# Patient Record
Sex: MALE | URBAN - METROPOLITAN AREA
[De-identification: names, ages, dates, MRNs, and addresses within clinical notes are randomized per-mention and may not be internally consistent; named-entity substitution may affect disease eponyms.]

---

## 2024-09-06 ENCOUNTER — APPOINTMENT (OUTPATIENT)
Dept: PRIMARY CARE | Facility: CLINIC | Age: 49
End: 2024-09-06
Payer: COMMERCIAL

## 2024-09-06 VITALS
OXYGEN SATURATION: 94 % | HEART RATE: 91 BPM | TEMPERATURE: 98.7 F | DIASTOLIC BLOOD PRESSURE: 82 MMHG | WEIGHT: 227 LBS | BODY MASS INDEX: 31.66 KG/M2 | SYSTOLIC BLOOD PRESSURE: 132 MMHG

## 2024-09-06 DIAGNOSIS — F43.23 ADJUSTMENT REACTION WITH ANXIETY AND DEPRESSION: ICD-10-CM

## 2024-09-06 DIAGNOSIS — F41.9 ANXIETY: Primary | ICD-10-CM

## 2024-09-06 DIAGNOSIS — Z13.220 LIPID SCREENING: ICD-10-CM

## 2024-09-06 PROCEDURE — 1036F TOBACCO NON-USER: CPT | Performed by: STUDENT IN AN ORGANIZED HEALTH CARE EDUCATION/TRAINING PROGRAM

## 2024-09-06 PROCEDURE — 99204 OFFICE O/P NEW MOD 45 MIN: CPT | Performed by: STUDENT IN AN ORGANIZED HEALTH CARE EDUCATION/TRAINING PROGRAM

## 2024-09-06 RX ORDER — SERTRALINE HYDROCHLORIDE 25 MG/1
25 TABLET, FILM COATED ORAL DAILY
Qty: 30 TABLET | Refills: 1 | Status: SHIPPED | OUTPATIENT
Start: 2024-09-06 | End: 2024-11-05

## 2024-09-06 RX ORDER — HYDROXYZINE PAMOATE 25 MG/1
CAPSULE ORAL
COMMUNITY
Start: 2024-08-29

## 2024-09-06 RX ORDER — CLOTRIMAZOLE 1 %
CREAM (GRAM) TOPICAL
COMMUNITY
Start: 2024-08-29

## 2024-09-06 ASSESSMENT — ENCOUNTER SYMPTOMS
SINUS PAIN: 0
ARTHRALGIAS: 0
LIGHT-HEADEDNESS: 0
DECREASED CONCENTRATION: 1
SINUS PRESSURE: 0
VOMITING: 0
WHEEZING: 0
NERVOUS/ANXIOUS: 1
DIZZINESS: 0
WOUND: 0
CONFUSION: 0
DYSURIA: 0
DYSPHORIC MOOD: 1
SLEEP DISTURBANCE: 1
COUGH: 0
POLYDIPSIA: 0
HEADACHES: 0
NAUSEA: 0
FREQUENCY: 0
FATIGUE: 0
AGITATION: 0
CONSTIPATION: 0
RHINORRHEA: 0
HALLUCINATIONS: 0
SHORTNESS OF BREATH: 0
DIARRHEA: 0
FEVER: 0
CHILLS: 0
MYALGIAS: 0
PALPITATIONS: 0

## 2024-09-06 ASSESSMENT — PATIENT HEALTH QUESTIONNAIRE - PHQ9
7. TROUBLE CONCENTRATING ON THINGS, SUCH AS READING THE NEWSPAPER OR WATCHING TELEVISION: NEARLY EVERY DAY
2. FEELING DOWN, DEPRESSED OR HOPELESS: NEARLY EVERY DAY
9. THOUGHTS THAT YOU WOULD BE BETTER OFF DEAD, OR OF HURTING YOURSELF: NOT AT ALL
8. MOVING OR SPEAKING SO SLOWLY THAT OTHER PEOPLE COULD HAVE NOTICED. OR THE OPPOSITE, BEING SO FIGETY OR RESTLESS THAT YOU HAVE BEEN MOVING AROUND A LOT MORE THAN USUAL: NEARLY EVERY DAY
SUM OF ALL RESPONSES TO PHQ QUESTIONS 1-9: 22
4. FEELING TIRED OR HAVING LITTLE ENERGY: NEARLY EVERY DAY
1. LITTLE INTEREST OR PLEASURE IN DOING THINGS: NEARLY EVERY DAY
5. POOR APPETITE OR OVEREATING: MORE THAN HALF THE DAYS
3. TROUBLE FALLING OR STAYING ASLEEP OR SLEEPING TOO MUCH: NEARLY EVERY DAY
SUM OF ALL RESPONSES TO PHQ9 QUESTIONS 1 AND 2: 6
6. FEELING BAD ABOUT YOURSELF - OR THAT YOU ARE A FAILURE OR HAVE LET YOURSELF OR YOUR FAMILY DOWN: MORE THAN HALF THE DAYS
10. IF YOU CHECKED OFF ANY PROBLEMS, HOW DIFFICULT HAVE THESE PROBLEMS MADE IT FOR YOU TO DO YOUR WORK, TAKE CARE OF THINGS AT HOME, OR GET ALONG WITH OTHER PEOPLE: VERY DIFFICULT

## 2024-09-06 ASSESSMENT — ANXIETY QUESTIONNAIRES
3. WORRYING TOO MUCH ABOUT DIFFERENT THINGS: NEARLY EVERY DAY
IF YOU CHECKED OFF ANY PROBLEMS ON THIS QUESTIONNAIRE, HOW DIFFICULT HAVE THESE PROBLEMS MADE IT FOR YOU TO DO YOUR WORK, TAKE CARE OF THINGS AT HOME, OR GET ALONG WITH OTHER PEOPLE: VERY DIFFICULT
2. NOT BEING ABLE TO STOP OR CONTROL WORRYING: MORE THAN HALF THE DAYS
GAD7 TOTAL SCORE: 17
1. FEELING NERVOUS, ANXIOUS, OR ON EDGE: NEARLY EVERY DAY
6. BECOMING EASILY ANNOYED OR IRRITABLE: SEVERAL DAYS
5. BEING SO RESTLESS THAT IT IS HARD TO SIT STILL: NEARLY EVERY DAY
4. TROUBLE RELAXING: NEARLY EVERY DAY
7. FEELING AFRAID AS IF SOMETHING AWFUL MIGHT HAPPEN: MORE THAN HALF THE DAYS

## 2024-09-06 ASSESSMENT — PAIN SCALES - GENERAL: PAINLEVEL: 6

## 2024-09-06 NOTE — PROGRESS NOTES
Subjective   Patient ID: Poli Spaulding is a 49 y.o. male who presents for New Patient Visit, Anxiety, and Depression.      Here today to establish care.     Moved to [facility] about 1 year ago with spouse.  Has been feeling some depression and isolation since moving here.  Now is going through a divorce and is having worsening depression and anxiety.      Has not recently had much interest in doing anything.  Previously used to derive meaning from caring for his pet dog, however he is recently had to have the animal stay elsewhere due to issues with the landlord.        Review of Systems   Constitutional:  Negative for chills, fatigue and fever.   HENT:  Negative for congestion, hearing loss, rhinorrhea, sinus pressure, sinus pain and tinnitus.    Eyes:  Negative for visual disturbance.   Respiratory:  Negative for cough, shortness of breath and wheezing.    Cardiovascular:  Negative for chest pain, palpitations and leg swelling.   Gastrointestinal:  Negative for constipation, diarrhea, nausea and vomiting.   Endocrine: Negative for cold intolerance, heat intolerance, polydipsia and polyuria.   Genitourinary:  Negative for dysuria, frequency and urgency.   Musculoskeletal:  Negative for arthralgias and myalgias.   Skin:  Negative for pallor, rash and wound.   Neurological:  Negative for dizziness, light-headedness and headaches.   Psychiatric/Behavioral:  Positive for decreased concentration, dysphoric mood and sleep disturbance. Negative for agitation, confusion, hallucinations, self-injury and suicidal ideas. The patient is nervous/anxious.        Objective     Visit Vitals  /82 (BP Location: Left arm)   Pulse 91   Temp 37.1 °C (98.7 °F) (Temporal)   Wt 103 kg (227 lb)   SpO2 94%   BMI 31.66 kg/m²   Smoking Status Never   BSA 2.27 m²         Physical Exam  Constitutional:       Appearance: Normal appearance. He is obese.   HENT:      Head: Normocephalic and atraumatic.      Right Ear: Tympanic  membrane, ear canal and external ear normal.      Left Ear: Tympanic membrane, ear canal and external ear normal.      Nose: Nose normal.      Mouth/Throat:      Mouth: Mucous membranes are moist.      Pharynx: Oropharynx is clear.   Eyes:      Extraocular Movements: Extraocular movements intact.      Conjunctiva/sclera: Conjunctivae normal.      Pupils: Pupils are equal, round, and reactive to light.   Cardiovascular:      Rate and Rhythm: Normal rate and regular rhythm.      Pulses: Normal pulses.      Heart sounds: Normal heart sounds.   Pulmonary:      Effort: Pulmonary effort is normal.      Breath sounds: Normal breath sounds.   Abdominal:      General: There is no distension.      Palpations: Abdomen is soft.      Tenderness: There is no abdominal tenderness.   Musculoskeletal:         General: Normal range of motion.   Skin:     General: Skin is warm and dry.   Neurological:      Mental Status: He is alert and oriented to person, place, and time. Mental status is at baseline.   Psychiatric:         Mood and Affect: Mood is depressed. Affect is tearful.         Behavior: Behavior normal.         Thought Content: Thought content does not include homicidal or suicidal ideation.         Assessment & Plan  Anxiety    Orders:    Tsh With Reflex To Free T4 If Abnormal; Future    sertraline (Zoloft) 25 mg tablet; Take 1 tablet (25 mg) by mouth once daily.    Referral to Psychology; Future    Adjustment reaction with anxiety and depression    Orders:    sertraline (Zoloft) 25 mg tablet; Take 1 tablet (25 mg) by mouth once daily.    Referral to Psychology; Future  I did provide the patient with an KAL[employer] letter.  Did advise the patient and include in the letter that he remains responsible for appropriate conduct of himself and the animal at all times, and that this letter does not excuse him from any damages caused to his rental property by setting normal, and that KALI letters only apply to rental housing, not public  spaces or transit.  Patient did express understanding.    Patient was provided with internal referral to  psychology, as well as information for community mental health resources.  Patient is advised that he will be expected to at least have an appointment scheduled to start with a therapist by his next office visit here.    Patient has never been on any kind of antidepressants before.  We will start a trial of sertraline 25 mg.  Patient was given information about common and expected side effects.  I will see him back in office in 4 weeks to assess how he is tolerating this medication, and to adjust dose as needed.    Patient also advised to find activities outside of the house in addition to going to work as well.  Lipid screening    Orders:    CBC; Future    Comprehensive metabolic panel; Future    Lipid panel; Future    Return to clinic in 4 weeks to assess depressive symptoms on sertraline, sooner as needed    Reviewed and approved by SEAN IBRAHIM on 9/6/24 at 3:19 PM.

## 2024-09-06 NOTE — PATIENT INSTRUCTIONS
We are starting you on Zoloft, a medication which is typically used for depression or anxiety.  It is a fairly safe medication.  There are a few things to keep in mind when taking this type of medication:    Take it with food.  This medication can cause stomach upset if you take it on an empty stomach, so take with something to eat.   Try in the morning first.  This type of medication should not cause drowsiness, so you can take it in the morning.  For a small number of people, it make them tired.  If you find that you are tired or drowsy from taking this medication, try taking it in the evening instead.   Once you figure out what time to take this medication, try to take it at roughly the same time every day.     Things to be aware of:    Time to see results - this type of medication is fairly slow.  We usually say to give it at least 4 weeks to determine if it works for you or not  Side effects - Side effects are usually mild.  They tend to include a small amount of weight gain (think 3-5 lbs), stomach upset, as well as decreased libido (sex drive).  If you have any severe or persistent side effects, there are other medication options we can try you on.

## 2024-09-06 NOTE — LETTER
To whom it may concern,    I have evaluated Poli Spaulding on 9/6/24 for a mental health condition.  My client's mental health disorder, which is recognized in the DSM-V handbook, substantially limits one or more major life activities or bodily functions.     To help with these limitations, I am recommending an emotional support animal.  The emotional support animal is necessary to provide therapeutic emotional support that alleviates the symptoms of my patient's disability, and to improve his ability to function independently.  Please allow my client to live with his KALI as a reasonable accomodation under the Fair Housing Accomodation Act of 1988.      Poli Spaulding has been appraised that he remains responsible for the good public conduct of his KALI and for any damages caused by the animal.      If you have any questions, please feel free to contact my office.       Regards,      Thompson Gonzalez DO  [place] Lic: [id number]

## 2024-10-09 ENCOUNTER — APPOINTMENT (OUTPATIENT)
Dept: PRIMARY CARE | Facility: CLINIC | Age: 49
End: 2024-10-09
Payer: COMMERCIAL